# Patient Record
Sex: MALE
[De-identification: names, ages, dates, MRNs, and addresses within clinical notes are randomized per-mention and may not be internally consistent; named-entity substitution may affect disease eponyms.]

---

## 2020-07-29 ENCOUNTER — TRANSCRIPTION ENCOUNTER (OUTPATIENT)
Age: 45
End: 2020-07-29

## 2024-01-10 ENCOUNTER — APPOINTMENT (OUTPATIENT)
Dept: OTOLARYNGOLOGY | Facility: CLINIC | Age: 49
End: 2024-01-10
Payer: COMMERCIAL

## 2024-01-10 VITALS — BODY MASS INDEX: 27.28 KG/M2 | WEIGHT: 180 LBS | HEIGHT: 68 IN

## 2024-01-10 DIAGNOSIS — Z78.9 OTHER SPECIFIED HEALTH STATUS: ICD-10-CM

## 2024-01-10 PROBLEM — Z00.00 ENCOUNTER FOR PREVENTIVE HEALTH EXAMINATION: Status: ACTIVE | Noted: 2024-01-10

## 2024-01-10 PROCEDURE — 92557 COMPREHENSIVE HEARING TEST: CPT

## 2024-01-10 PROCEDURE — 99024 POSTOP FOLLOW-UP VISIT: CPT

## 2024-01-10 PROCEDURE — 92567 TYMPANOMETRY: CPT

## 2024-01-10 RX ORDER — CEFUROXIME AXETIL 250 MG/1
250 TABLET ORAL
Qty: 20 | Refills: 1 | Status: ACTIVE | COMMUNITY
Start: 2024-01-10 | End: 1900-01-01

## 2024-01-10 RX ORDER — LORATADINE 10 MG/1
10 TABLET ORAL
Refills: 0 | Status: ACTIVE | COMMUNITY

## 2024-01-10 RX ORDER — BENZONATATE 200 MG/1
200 CAPSULE ORAL 3 TIMES DAILY
Qty: 30 | Refills: 0 | Status: ACTIVE | COMMUNITY
Start: 2024-01-10 | End: 1900-01-01

## 2024-01-10 RX ORDER — SERTRALINE HYDROCHLORIDE 150 MG/1
150 CAPSULE ORAL
Refills: 0 | Status: ACTIVE | COMMUNITY

## 2024-01-10 RX ORDER — MULTIVIT-MIN/IRON/FOLIC ACID/K 18-600-40
CAPSULE ORAL
Refills: 0 | Status: ACTIVE | COMMUNITY

## 2024-01-10 RX ORDER — NEOMYCIN AND POLYMYXIN B SULFATES AND HYDROCORTISONE OTIC 10; 3.5; 1 MG/ML; MG/ML; [USP'U]/ML
3.5-10000-1 SUSPENSION AURICULAR (OTIC)
Refills: 0 | Status: ACTIVE | COMMUNITY

## 2024-01-10 RX ORDER — FAMOTIDINE 20 MG/1
20 TABLET, FILM COATED ORAL
Refills: 0 | Status: ACTIVE | COMMUNITY

## 2024-01-10 RX ORDER — BENZONATATE 200 MG/1
200 CAPSULE ORAL
Refills: 0 | Status: ACTIVE | COMMUNITY

## 2024-01-10 RX ORDER — ATORVASTATIN CALCIUM 40 MG/1
40 TABLET, FILM COATED ORAL
Refills: 0 | Status: ACTIVE | COMMUNITY

## 2024-01-10 RX ORDER — FLUTICASONE PROPIONATE 50 MCG
50 SPRAY, SUSPENSION NASAL
Refills: 0 | Status: ACTIVE | COMMUNITY

## 2024-01-10 NOTE — REVIEW OF SYSTEMS
[Post Nasal Drip] : post nasal drip [Nasal Congestion] : nasal congestion [Nose Bleeds] : nose bleeds [Throat Clearing] : throat clearing [Throat Pain] : throat pain [Cough] : cough

## 2024-01-16 LAB — EAR NOSE AND THROAT CULTURE: ABNORMAL

## 2024-01-16 NOTE — HISTORY OF PRESENT ILLNESS
[de-identified] : MARK ARNOLD is a 48 year old male who comes in complaining of having a clogged left ear for 2 weeks and he now has left-sided facial pain.  About 2 weeks ago he developed a sore throat with a runny nose and a dry cough and then last Thursday he developed pain and a clogged feeling in his left ear.  He was seen in urgent care and put on benzonatate and Cortisporin.  He still has the blocked feeling in the left ear and now he has left-sided facial pain.  He had a little bit of blood in his nose today.  He has been taking loratadine famotidine Flonase saline and benzonatate as well as the Cortisporin.  The patient had no other ear nose or throat complaints at this visit.

## 2024-01-16 NOTE — PHYSICAL EXAM
[FreeTextEntry1] : The patient was alert and oriented and in no distress. Voice was clear.  Face: The patient had no facial asymmetry or mass. The skin was unremarkable.  Eyes: The pupils were equal round and reactive to light and accommodation. There was no significant nystagmus or disconjugate gaze noted.  Nose:  The external nose had no significant deformity.  There was no facial tenderness.  On anterior rhinoscopy, the nasal mucosa was clear.  The anterior septum was midline.  There were no visualized polyps purulence  or masses.  Oral cavity: The oral mucosa was normal. The oral and base of tongue were clear and without mass. The gingival and buccal mucosa were moist and without lesions. The palate moved well. There was no cleft to the palate. There appeared to be good salivary flow.   There was no pus, erythema or mass in the oral cavity.   Ears: The external ears were normal without deformity. The ear canals were clear. The left ear canal had the drops in the canal which was suctioned to clear There was an effusion on the left and the right was clear Neck:  The neck was symmetrical. The parotid and submandibular glands were normal without masses. The trachea was midline and there was no unusual crepitus. The thyroid was smooth and nontender and no masses were palpated. There was no significant cervical adenopathy.   Neuro: Neurologically, the patient was awake, alert, and oriented to person, place and time. There were no obvious focal neurologic abnormalities.  Cranial nerves II through XII were grossly intact.   TMJ: The temporomandibular joints were nontender. There was no abnormal crepitus and no significant malocclusion  [de-identified] : Nasal endoscopy:  CPT 81898 Procedure Note:  Endoscopy was done with Covid precautions and with video. All risks and benefits were discussed with the patient and consent obtained.  Nasal endoscopy was done with topical anesthesia of Pontocaine and Afrin and a      nasal endoscope. Indication: Nasal congestion, rule out sinusitis. Procedure: The nasal cavity was anesthetized with topical Afrin and Pontocaine. An  endoscope was used and inserted into the nasal cavity. Attention was first paid to the anterior nasal cavity. Endocoscopy was performed to inspect the interior of the nasal cavity, the nasal septum,  the middle and superior meati, the inferior, middle and superior turbinates, and the spheno-ethmoidal  recesses, the nasopharynx and eustachian tube orifices bilaterally. including the nasal mocosa, the possibility of polyps and the consistency of the nasal mucous. All findings were normal except: He had a sharp obstructing left septal spur and pus in the left middle meatus switching to a rigid 0 degree endoscope this was cultured

## 2024-01-16 NOTE — ASSESSMENT
[FreeTextEntry1] : It was my impression is that this was a new episode of acute sinusitis and otitis media with effusion.  I reviewed the pathogenesis with the patient.  Pending the culture results I recommended a course of 4 days of Afrin and doubling up on his fluticasone for 2 weeks as a nasal steroid and Ceftin as an antibiotic.  I would change antibiotics as indicated by the culture.  I recommended hypertonic saline rinses and would like to see the patient back in 2-3 weeks to make sure that this has responded. I did not see any role for his eardrops and suggested stopping them.

## 2024-01-16 NOTE — REASON FOR VISIT
[Initial Consultation] : an initial consultation for [Throat Pain] : throat pain [FreeTextEntry2] : head cold

## 2024-01-16 NOTE — CONSULT LETTER
[FreeTextEntry2] : SLIME RICHARDSON [FreeTextEntry1] :   Dear  Dr. SLIME RICHARDSON,  I had the pleasure of seeing your patient today.   Please see my note below.   Thank you very much for allowing me to participate in the care of your patient.  Sincerely,  Albert Salvador MD [facility]

## 2024-01-25 ENCOUNTER — APPOINTMENT (OUTPATIENT)
Dept: OTOLARYNGOLOGY | Facility: CLINIC | Age: 49
End: 2024-01-25
Payer: COMMERCIAL

## 2024-01-25 VITALS — HEIGHT: 68 IN | BODY MASS INDEX: 27.28 KG/M2 | WEIGHT: 180 LBS

## 2024-01-25 DIAGNOSIS — H66.90 OTITIS MEDIA, UNSPECIFIED, UNSPECIFIED EAR: ICD-10-CM

## 2024-01-25 DIAGNOSIS — K21.9 GASTRO-ESOPHAGEAL REFLUX DISEASE W/OUT ESOPHAGITIS: ICD-10-CM

## 2024-01-25 DIAGNOSIS — J01.00 ACUTE MAXILLARY SINUSITIS, UNSPECIFIED: ICD-10-CM

## 2024-01-25 PROCEDURE — 99212 OFFICE O/P EST SF 10 MIN: CPT

## 2024-01-25 RX ORDER — FAMOTIDINE 40 MG/1
40 TABLET, FILM COATED ORAL
Qty: 30 | Refills: 4 | Status: ACTIVE | COMMUNITY
Start: 2024-01-25 | End: 1900-01-01

## 2024-02-15 ENCOUNTER — APPOINTMENT (OUTPATIENT)
Dept: OTOLARYNGOLOGY | Facility: CLINIC | Age: 49
End: 2024-02-15

## 2024-04-23 ENCOUNTER — NON-APPOINTMENT (OUTPATIENT)
Age: 49
End: 2024-04-23

## 2024-05-29 ENCOUNTER — APPOINTMENT (OUTPATIENT)
Dept: OTOLARYNGOLOGY | Facility: CLINIC | Age: 49
End: 2024-05-29
Payer: COMMERCIAL

## 2024-05-29 DIAGNOSIS — J34.2 DEVIATED NASAL SEPTUM: ICD-10-CM

## 2024-05-29 DIAGNOSIS — H61.20 IMPACTED CERUMEN, UNSPECIFIED EAR: ICD-10-CM

## 2024-05-29 DIAGNOSIS — H93.299 OTHER ABNORMAL AUDITORY PERCEPTIONS, UNSPECIFIED EAR: ICD-10-CM

## 2024-05-29 PROCEDURE — 99212 OFFICE O/P EST SF 10 MIN: CPT

## 2024-05-29 PROCEDURE — 92557 COMPREHENSIVE HEARING TEST: CPT

## 2024-05-29 PROCEDURE — 92567 TYMPANOMETRY: CPT

## 2024-05-29 NOTE — HISTORY OF PRESENT ILLNESS
[de-identified] : MARK SILVESTRE Was seen in follow-up on May 29.  I had previously seen him with M. catarrhalis sinusitis and otitis media.  He had done well but he feels like his left ear again is blocked with diminished hearing but without otalgia.  It is worse when he lays on that side.  He does not have significant nasal congestion.  The patient had no other ear nose or throat complaints at this visit.

## 2024-05-29 NOTE — CONSULT LETTER
[FreeTextEntry2] : SLIME RICHARDSON [FreeTextEntry1] :   Dear  Dr. SLIME RICHARDSON,  I had the pleasure of seeing your patient today.   Please see my note below.   Thank you very much for allowing me to participate in the care of your patient.  Sincerely,  Albert Salvador MD [facility]  [facility]

## 2024-05-29 NOTE — ASSESSMENT
[FreeTextEntry1] : It was my impression that the patient had a cerumen impaction that was cleared.  I recommended topical moisturizing.  I recommended avoiding Q-tips.  I reviewed aural hygiene and the role of cerumen with the patient.  I suggested a repeat visit in a year or earlier should the need arise. There was no evidence of recurrent or persistent otitis media and his hearing was excellent with normal tympanograms and I reassured him

## 2024-05-29 NOTE — PHYSICAL EXAM
[FreeTextEntry1] :  The patient was alert and oriented and in no distress. Voice was clear.  Face: The patient had no facial asymmetry or mass. The skin was unremarkable.  Eyes: The pupils were equal round and reactive to light and accommodation. There was no significant nystagmus or disconjugate gaze noted.  Nose:  The external nose had no significant deformity.  There was no facial tenderness.  On anterior rhinoscopy, the nasal mucosa was clear.  The anterior septum was midline.  There were no visualized polyps purulence  or masses.  Oral cavity: The oral mucosa was normal. The oral and base of tongue were clear and without mass. The gingival and buccal mucosa were moist and without lesions. The palate moved well. There was no cleft to the palate. There appeared to be good salivary flow.   There was no pus, erythema or mass in the oral cavity.   Ears: Procedure note: Debridement of cerumen impaction left ear   [id number]  Dx:  symptomatic cerumen impaction left ear  Both external ears were normal. There was a dense cerumen impaction in the left ear.  This was cleared microscopically without trauma, using suction and curettes.  Beyond that, both ear canals were clear and both eardrums were intact and mobile.  verbal consent was obtained and patient felt improvement after procedure  Neck:  The neck was symmetrical. The parotid and submandibular glands were normal without masses. The trachea was midline and there was no unusual crepitus. The thyroid was smooth and nontender and no masses were palpated. There was no significant cervical adenopathy.   Neuro: Neurologically, the patient was awake, alert, and oriented to person, place and time. There were no obvious focal neurologic abnormalities.  Cranial nerves II through XII were grossly intact.   TMJ: The temporomandibular joints were nontender. There was no abnormal crepitus and no significant malocclusion

## 2024-05-31 PROBLEM — H93.299 ABNORMAL AUDITORY PERCEPTION: Status: ACTIVE | Noted: 2024-01-10
